# Patient Record
Sex: MALE | Race: WHITE | Employment: FULL TIME | URBAN - METROPOLITAN AREA
[De-identification: names, ages, dates, MRNs, and addresses within clinical notes are randomized per-mention and may not be internally consistent; named-entity substitution may affect disease eponyms.]

---

## 2022-06-15 ENCOUNTER — OFFICE VISIT (OUTPATIENT)
Dept: URGENT CARE | Facility: URGENT CARE | Age: 27
End: 2022-06-15
Payer: OTHER MISCELLANEOUS

## 2022-06-15 VITALS
DIASTOLIC BLOOD PRESSURE: 98 MMHG | HEART RATE: 85 BPM | OXYGEN SATURATION: 99 % | SYSTOLIC BLOOD PRESSURE: 144 MMHG | RESPIRATION RATE: 16 BRPM | TEMPERATURE: 98 F

## 2022-06-15 DIAGNOSIS — S61.012A LACERATION OF LEFT THUMB WITHOUT FOREIGN BODY, NAIL DAMAGE STATUS UNSPECIFIED, INITIAL ENCOUNTER: Primary | ICD-10-CM

## 2022-06-15 PROCEDURE — 12001 RPR S/N/AX/GEN/TRNK 2.5CM/<: CPT | Performed by: FAMILY MEDICINE

## 2022-06-15 ASSESSMENT — PAIN SCALES - GENERAL: PAINLEVEL: MILD PAIN (2)

## 2022-06-15 NOTE — LETTER
[facility] [place]  [address]  [address]  [place] [address]  Phone: [phone]  Fax: [phone]    Nohemi 15, 2022        David Cortes  [address] [address]          To whom it may concern:    RE: David Cortes    Patient was seen and treated today at our clinic and missed work. May return Friday.    Please contact me for questions or concerns.      Sincerely,        Bimal Cole MD

## 2022-06-16 NOTE — PROGRESS NOTES
SUBJECTIVE:  CHIEF COMPLAINT: laceration lt thumb at work today.  Works at restaurant and cut thumb with knife.    OBJECTIVE:  EXAM:  Patient appears in alert distress.  VITALS: Blood pressure (!) 144/98, pulse 85, temperature 98  F (36.7  C), temperature source Tympanic, resp. rate 16, SpO2 99 %.  Examination of the lt thumb.  1cm flap laceration tip of thumb.  Normal rom thumb and no sensory change.    ASSESSMENT:  Laceration thumb 1cm    PLAN:  Procedure:  The wound was cleaned.  Edges of square flap at tip of thumb was tacked in place with Skin adhesive.  2 tubes of skin adhesive used.  Bandaids place.  Printed information on laceration repaired with skin adhesive given to patient.  No work tomorrow.  Watch for infection.  Protect tip and leave adhesive in place until wears off.